# Patient Record
Sex: MALE | Race: OTHER | NOT HISPANIC OR LATINO | URBAN - METROPOLITAN AREA
[De-identification: names, ages, dates, MRNs, and addresses within clinical notes are randomized per-mention and may not be internally consistent; named-entity substitution may affect disease eponyms.]

---

## 2020-08-14 ENCOUNTER — TELEPHONE (OUTPATIENT)
Dept: UROLOGY | Facility: CLINIC | Age: 85
End: 2020-08-14

## 2020-08-14 NOTE — TELEPHONE ENCOUNTER
----- Message from Suellen Escalante sent at 8/14/2020  8:39 AM CDT -----  Regarding: Sooner appointment (HOSP FU)  Type:  Sooner Apoointment Request    Caller is requesting a sooner appointment.  Caller declined first available appointment listed below.  Caller will not accept being placed on the waitlist and is requesting a message be sent to doctor.  Name of Caller: Africa Bowen  When is the first available appointment?n/a   Symptoms: HOSP FU   Would the patient rather a call back or a response via [employer]? Call back   Best Call Back Number : [phone]  Additional Information: n/a

## 2020-08-14 NOTE — TELEPHONE ENCOUNTER
Scheduled pt an appt with Dr. Wills for 9/2. Pt will have gaviria placed today & will be sent home on Flomax.